# Patient Record
Sex: FEMALE
[De-identification: names, ages, dates, MRNs, and addresses within clinical notes are randomized per-mention and may not be internally consistent; named-entity substitution may affect disease eponyms.]

---

## 2021-02-17 PROBLEM — Z00.00 ENCOUNTER FOR PREVENTIVE HEALTH EXAMINATION: Status: ACTIVE | Noted: 2021-02-17

## 2021-02-23 ENCOUNTER — LABORATORY RESULT (OUTPATIENT)
Age: 21
End: 2021-02-23

## 2021-02-23 ENCOUNTER — NON-APPOINTMENT (OUTPATIENT)
Age: 21
End: 2021-02-23

## 2021-02-23 ENCOUNTER — APPOINTMENT (OUTPATIENT)
Dept: HEART AND VASCULAR | Facility: CLINIC | Age: 21
End: 2021-02-23
Payer: MEDICARE

## 2021-02-23 VITALS
HEIGHT: 65 IN | SYSTOLIC BLOOD PRESSURE: 134 MMHG | RESPIRATION RATE: 14 BRPM | BODY MASS INDEX: 27.49 KG/M2 | OXYGEN SATURATION: 97 % | DIASTOLIC BLOOD PRESSURE: 80 MMHG | WEIGHT: 165 LBS | TEMPERATURE: 98 F | HEART RATE: 84 BPM

## 2021-02-23 DIAGNOSIS — Z83.49 FAMILY HISTORY OF OTHER ENDOCRINE, NUTRITIONAL AND METABOLIC DISEASES: ICD-10-CM

## 2021-02-23 DIAGNOSIS — Z83.3 FAMILY HISTORY OF DIABETES MELLITUS: ICD-10-CM

## 2021-02-23 DIAGNOSIS — R03.0 ELEVATED BLOOD-PRESSURE READING, W/OUT DIAGNOSIS OF HYPERTENSION: ICD-10-CM

## 2021-02-23 DIAGNOSIS — Z82.49 FAMILY HISTORY OF ISCHEMIC HEART DISEASE AND OTHER DISEASES OF THE CIRCULATORY SYSTEM: ICD-10-CM

## 2021-02-23 DIAGNOSIS — Z78.9 OTHER SPECIFIED HEALTH STATUS: ICD-10-CM

## 2021-02-23 DIAGNOSIS — Z82.79 FAMILY HISTORY OF OTHER CONGENITAL MALFORMATIONS, DEFORMATIONS AND CHROMOSOMAL ABNORMALITIES: ICD-10-CM

## 2021-02-23 PROCEDURE — 99204 OFFICE O/P NEW MOD 45 MIN: CPT

## 2021-02-23 PROCEDURE — 93000 ELECTROCARDIOGRAM COMPLETE: CPT

## 2021-02-23 PROCEDURE — 99072 ADDL SUPL MATRL&STAF TM PHE: CPT

## 2021-02-24 LAB
ALBUMIN SERPL ELPH-MCNC: 4.9 G/DL
ALDOSTERONE SERUM: 10.4 NG/DL
ALP BLD-CCNC: 53 U/L
ALT SERPL-CCNC: 16 U/L
ANION GAP SERPL CALC-SCNC: 15 MMOL/L
AST SERPL-CCNC: 19 U/L
BASOPHILS # BLD AUTO: 0.03 K/UL
BASOPHILS NFR BLD AUTO: 0.4 %
BILIRUB SERPL-MCNC: 0.3 MG/DL
BUN SERPL-MCNC: 13 MG/DL
CALCIUM SERPL-MCNC: 9.9 MG/DL
CHLORIDE SERPL-SCNC: 101 MMOL/L
CHOLEST SERPL-MCNC: 261 MG/DL
CO2 SERPL-SCNC: 24 MMOL/L
CREAT SERPL-MCNC: 0.85 MG/DL
EOSINOPHIL # BLD AUTO: 0.09 K/UL
EOSINOPHIL NFR BLD AUTO: 1.3 %
GLUCOSE SERPL-MCNC: 89 MG/DL
HCT VFR BLD CALC: 36.7 %
HDLC SERPL-MCNC: 62 MG/DL
HGB BLD-MCNC: 11.5 G/DL
IMM GRANULOCYTES NFR BLD AUTO: 0.4 %
LDLC SERPL CALC-MCNC: 177 MG/DL
LYMPHOCYTES # BLD AUTO: 1.6 K/UL
LYMPHOCYTES NFR BLD AUTO: 22.6 %
MAN DIFF?: NORMAL
MCHC RBC-ENTMCNC: 26.6 PG
MCHC RBC-ENTMCNC: 31.3 GM/DL
MCV RBC AUTO: 84.8 FL
MONOCYTES # BLD AUTO: 0.41 K/UL
MONOCYTES NFR BLD AUTO: 5.8 %
NEUTROPHILS # BLD AUTO: 4.91 K/UL
NEUTROPHILS NFR BLD AUTO: 69.5 %
NONHDLC SERPL-MCNC: 198 MG/DL
PLATELET # BLD AUTO: 265 K/UL
POTASSIUM SERPL-SCNC: 4.2 MMOL/L
PROT SERPL-MCNC: 7.9 G/DL
RBC # BLD: 4.33 M/UL
RBC # FLD: 14 %
RENIN PLASMA: 14.4 PG/ML
SODIUM SERPL-SCNC: 139 MMOL/L
T3FREE SERPL-MCNC: 3.16 PG/ML
T3RU NFR SERPL: 1.1 TBI
T4 SERPL-MCNC: 9.6 UG/DL
TRIGL SERPL-MCNC: 106 MG/DL
TSH SERPL-ACNC: 1.45 UIU/ML
WBC # FLD AUTO: 7.07 K/UL

## 2021-02-26 ENCOUNTER — APPOINTMENT (OUTPATIENT)
Dept: HEART AND VASCULAR | Facility: CLINIC | Age: 21
End: 2021-02-26
Payer: MEDICARE

## 2021-02-26 PROCEDURE — 99072 ADDL SUPL MATRL&STAF TM PHE: CPT

## 2021-02-26 PROCEDURE — 93975 VASCULAR STUDY: CPT

## 2021-02-26 PROCEDURE — 93306 TTE W/DOPPLER COMPLETE: CPT

## 2021-03-02 LAB
DOPAMINE UR-MCNC: <30 PG/ML
EPINEPH UR-MCNC: 175 PG/ML
NOREPINEPH UR-MCNC: 459 PG/ML

## 2021-03-02 NOTE — REASON FOR VISIT
[Initial Evaluation] : an initial evaluation of [Hypertension] : hypertension [FreeTextEntry1] : 20 year old F with recent diagnosis of irregular menstrual cycles and started on birth control ring. Noticed elevated BP readings shortly after (SBP up to 160 mm Hg). Denies chest pain, dyspnea orthopnea, PND, edema. Stopped ring x 3 weeks. Repeat BP today 140/80 mm Hg. \par \par EKG today NSR at 85 bpm without STT abnormalities.

## 2021-03-02 NOTE — DISCUSSION/SUMMARY
[FreeTextEntry1] : 20 year old F with recent diagnosis of irregular menstrual cycles and started on birth control ring. Noticed elevated BP readings\par - Asked patient to get BP monitor and keep BP log\par - Check secondary HTN workup, including PREM. \par - Check Echocardiogram\par - Return for followup after testing.

## 2021-03-03 ENCOUNTER — NON-APPOINTMENT (OUTPATIENT)
Age: 21
End: 2021-03-03

## 2021-03-04 ENCOUNTER — NON-APPOINTMENT (OUTPATIENT)
Age: 21
End: 2021-03-04

## 2021-03-04 LAB — SEROTONIN SERUM: 60 NG/ML

## 2021-03-05 LAB
CORTICOSTEROID BIND GLOBULIN: 2.8 MG/DL
CORTIS SERPL-MCNC: 7 UG/DL
CORTISOL, FREE: 0.2 UG/DL
PFCX: 2.8 %

## 2021-03-09 ENCOUNTER — APPOINTMENT (OUTPATIENT)
Dept: HEART AND VASCULAR | Facility: CLINIC | Age: 21
End: 2021-03-09
Payer: MEDICARE

## 2021-03-09 ENCOUNTER — APPOINTMENT (OUTPATIENT)
Dept: ENDOCRINOLOGY | Facility: CLINIC | Age: 21
End: 2021-03-09
Payer: MEDICARE

## 2021-03-09 VITALS
SYSTOLIC BLOOD PRESSURE: 118 MMHG | HEART RATE: 86 BPM | TEMPERATURE: 98.1 F | OXYGEN SATURATION: 100 % | DIASTOLIC BLOOD PRESSURE: 70 MMHG | WEIGHT: 165 LBS | RESPIRATION RATE: 16 BRPM

## 2021-03-09 DIAGNOSIS — R79.89 OTHER SPECIFIED ABNORMAL FINDINGS OF BLOOD CHEMISTRY: ICD-10-CM

## 2021-03-09 DIAGNOSIS — E28.2 POLYCYSTIC OVARIAN SYNDROME: ICD-10-CM

## 2021-03-09 PROCEDURE — 99204 OFFICE O/P NEW MOD 45 MIN: CPT | Mod: 95

## 2021-03-09 PROCEDURE — 99072 ADDL SUPL MATRL&STAF TM PHE: CPT

## 2021-03-09 PROCEDURE — 99214 OFFICE O/P EST MOD 30 MIN: CPT

## 2021-03-10 PROBLEM — R79.89 ELEVATED PLASMA METANEPHRINES: Status: ACTIVE | Noted: 2021-03-10

## 2021-03-10 PROBLEM — E28.2 PCOS (POLYCYSTIC OVARIAN SYNDROME): Status: ACTIVE | Noted: 2021-03-09

## 2021-03-10 NOTE — HISTORY OF PRESENT ILLNESS
[Home] : at home, [unfilled] , at the time of the visit. [Other Location: e.g. Home (Enter Location, City,State)___] : at [unfilled] [Verbal consent obtained from patient] : the patient, [unfilled] [FreeTextEntry1] : Very pleasant 20-year-old girl who was evaluated by cardiology for elevated blood pressure which somewhat got worse when she was initiated on birth control pill for PCOS concerns.  Secondary work-up of elevated blood pressure was performed and she was noted to have epinephrine levels to the tune of 175 pg/mL.  As per her she feels that she has whitecoat hypertension.  When she checks blood pressures at home which have been largely normal she denies any severe headaches or any other symptoms of pheochromocytoma.  There is no other family history of any such tumors.  Patient has been working with her OB/GYN for concerns of PCOS.  She has been advised to start Metformin which she has not started because of pending work-up.

## 2021-03-10 NOTE — REASON FOR VISIT
[Consultation] : a consultation visit [Adrenal Evaluation/Adrenal Disorder] : adrenal evaluation/adrenal disorder [PCOS] : PCOS

## 2021-03-12 NOTE — DISCUSSION/SUMMARY
[FreeTextEntry1] : 20 year old F with recent diagnosis of irregular menstrual cycles and started on birth control for PCOS. Elevated BP readings on birth control. Now normalized. LDL elevated. \par - Patient to likely be started on Metformin for PCOS, which may help with LDL \par - Diet/Exercise. \par - To see Endocrine for horm\par - Seeing Endocrine for isolated epinephrine levels. To get repeat labs done. \par - Can return in 1 year.

## 2021-03-12 NOTE — REASON FOR VISIT
[Initial Evaluation] : an initial evaluation of [Hypertension] : hypertension [FreeTextEntry1] : 20 year old F with recent diagnosis of irregular menstrual cycles and started on birth control ring. Noticed elevated BP readings shortly after (SBP up to 160 mm Hg). Its been 4-6 weeks, and her BP has normalized now.  Denies chest pain, dyspnea orthopnea, PND, edema. \par \par Labs 02/24/2021: ; ; Total chol 261; HDL 62; CMP WNL; TSH 1.45; CBC WNL; Aldosterone Normal, Renin 14.4 (normal); Epinephrine 175; Norepinephrine 459; Dopamine <30, Serotonin 60\par \par Echo 02/26/2021: Trace MR, Normal trileaflet AV, LVEF 65%, Trace TR, RVSP 18 mm Hg. \par \par EKG 02/23/2021 NSR at 85 bpm without STT abnormalities.

## 2021-04-01 LAB
CORTIS 24H UR-MCNC: 24 H
CORTIS 24H UR-MRATE: 26 MCG/24 H
SPECIMEN VOL 24H UR: 850 ML

## 2021-04-06 LAB
DOPAMINE UR-MCNC: 215 UG/L
DOPAMINE, UR, 24HR: 19 UG/24 HR
EPINEPH UR-MCNC: 3 UG/L
EPINEPHRINE, U, 24HR: 0 UG/24 HR
NOREPINEPH UR-MCNC: 29 UG/L
NOREPINEPHRINE,U,24H: 3 UG/24 HR

## 2021-04-19 LAB
METANEPHRINE, PL: 10.2 PG/ML
NORMETANEPHRINE, PL: 90.5 PG/ML

## 2022-01-04 ENCOUNTER — APPOINTMENT (OUTPATIENT)
Dept: HEART AND VASCULAR | Facility: CLINIC | Age: 22
End: 2022-01-04